# Patient Record
Sex: FEMALE | Race: WHITE | NOT HISPANIC OR LATINO | Employment: UNEMPLOYED | URBAN - METROPOLITAN AREA
[De-identification: names, ages, dates, MRNs, and addresses within clinical notes are randomized per-mention and may not be internally consistent; named-entity substitution may affect disease eponyms.]

---

## 2023-02-27 ENCOUNTER — OFFICE VISIT (OUTPATIENT)
Dept: GASTROENTEROLOGY | Facility: CLINIC | Age: 28
End: 2023-02-27
Payer: COMMERCIAL

## 2023-02-27 ENCOUNTER — LAB (OUTPATIENT)
Dept: LAB | Facility: HOSPITAL | Age: 28
End: 2023-02-27
Payer: COMMERCIAL

## 2023-02-27 VITALS
BODY MASS INDEX: 43.39 KG/M2 | DIASTOLIC BLOOD PRESSURE: 78 MMHG | WEIGHT: 270 LBS | HEART RATE: 72 BPM | HEIGHT: 66 IN | SYSTOLIC BLOOD PRESSURE: 111 MMHG

## 2023-02-27 DIAGNOSIS — K76.0 FATTY LIVER: ICD-10-CM

## 2023-02-27 DIAGNOSIS — R74.8 ELEVATED LIVER ENZYMES: ICD-10-CM

## 2023-02-27 DIAGNOSIS — R74.8 ELEVATED LIVER ENZYMES: Primary | ICD-10-CM

## 2023-02-27 LAB
BASOPHILS # BLD AUTO: 0.07 10*3/MM3 (ref 0–0.2)
BASOPHILS NFR BLD AUTO: 1 % (ref 0–1.5)
DEPRECATED RDW RBC AUTO: 39.7 FL (ref 37–54)
EOSINOPHIL # BLD AUTO: 0.33 10*3/MM3 (ref 0–0.4)
EOSINOPHIL NFR BLD AUTO: 4.6 % (ref 0.3–6.2)
ERYTHROCYTE [DISTWIDTH] IN BLOOD BY AUTOMATED COUNT: 12.1 % (ref 12.3–15.4)
HAV IGM SERPL QL IA: NORMAL
HBV CORE IGM SERPL QL IA: NORMAL
HBV SURFACE AG SERPL QL IA: NORMAL
HCT VFR BLD AUTO: 50 % (ref 34–46.6)
HCV AB SER DONR QL: NORMAL
HGB BLD-MCNC: 16.9 G/DL (ref 12–15.9)
IMM GRANULOCYTES # BLD AUTO: 0.02 10*3/MM3 (ref 0–0.05)
IMM GRANULOCYTES NFR BLD AUTO: 0.3 % (ref 0–0.5)
LYMPHOCYTES # BLD AUTO: 2.21 10*3/MM3 (ref 0.7–3.1)
LYMPHOCYTES NFR BLD AUTO: 31 % (ref 19.6–45.3)
MCH RBC QN AUTO: 30.2 PG (ref 26.6–33)
MCHC RBC AUTO-ENTMCNC: 33.8 G/DL (ref 31.5–35.7)
MCV RBC AUTO: 89.4 FL (ref 79–97)
MONOCYTES # BLD AUTO: 0.42 10*3/MM3 (ref 0.1–0.9)
MONOCYTES NFR BLD AUTO: 5.9 % (ref 5–12)
NEUTROPHILS NFR BLD AUTO: 4.09 10*3/MM3 (ref 1.7–7)
NEUTROPHILS NFR BLD AUTO: 57.2 % (ref 42.7–76)
NRBC BLD AUTO-RTO: 0 /100 WBC (ref 0–0.2)
PLATELET # BLD AUTO: 336 10*3/MM3 (ref 140–450)
PMV BLD AUTO: 10.8 FL (ref 6–12)
RBC # BLD AUTO: 5.59 10*6/MM3 (ref 3.77–5.28)
WBC NRBC COR # BLD: 7.14 10*3/MM3 (ref 3.4–10.8)

## 2023-02-27 PROCEDURE — 86334 IMMUNOFIX E-PHORESIS SERUM: CPT

## 2023-02-27 PROCEDURE — 84466 ASSAY OF TRANSFERRIN: CPT

## 2023-02-27 PROCEDURE — 86015 ACTIN ANTIBODY EACH: CPT | Performed by: NURSE PRACTITIONER

## 2023-02-27 PROCEDURE — 83540 ASSAY OF IRON: CPT

## 2023-02-27 PROCEDURE — 99204 OFFICE O/P NEW MOD 45 MIN: CPT | Performed by: NURSE PRACTITIONER

## 2023-02-27 PROCEDURE — 86038 ANTINUCLEAR ANTIBODIES: CPT | Performed by: NURSE PRACTITIONER

## 2023-02-27 PROCEDURE — 86225 DNA ANTIBODY NATIVE: CPT | Performed by: NURSE PRACTITIONER

## 2023-02-27 PROCEDURE — 80053 COMPREHEN METABOLIC PANEL: CPT

## 2023-02-27 PROCEDURE — 82784 ASSAY IGA/IGD/IGG/IGM EACH: CPT

## 2023-02-27 PROCEDURE — 86381 MITOCHONDRIAL ANTIBODY EACH: CPT | Performed by: NURSE PRACTITIONER

## 2023-02-27 PROCEDURE — 36415 COLL VENOUS BLD VENIPUNCTURE: CPT | Performed by: NURSE PRACTITIONER

## 2023-02-27 PROCEDURE — 80074 ACUTE HEPATITIS PANEL: CPT | Performed by: NURSE PRACTITIONER

## 2023-02-27 PROCEDURE — 85025 COMPLETE CBC W/AUTO DIFF WBC: CPT

## 2023-02-27 PROCEDURE — 82103 ALPHA-1-ANTITRYPSIN TOTAL: CPT | Performed by: NURSE PRACTITIONER

## 2023-02-27 PROCEDURE — 82390 ASSAY OF CERULOPLASMIN: CPT | Performed by: NURSE PRACTITIONER

## 2023-02-27 RX ORDER — METFORMIN HYDROCHLORIDE 500 MG/1
TABLET, EXTENDED RELEASE ORAL
COMMUNITY
Start: 2023-01-27

## 2023-02-27 NOTE — PROGRESS NOTES
"Chief Complaint     Elevated Hepatic Enzymes    History of Present Illness     Ginette Khalil is a 27 y.o. female who presents to [facility] on referral from Milla Figueroa MD for a gastroenterology evaluation of elevated liver enzymes.      Reports intermittent lower abdominal pain.  Reports having stones in gallbladder and has been evaluated by surgery. PCP was concerned about elevation liver enzymes and wanted her to undergo further evaluation.      Denies previous illicit drug use, ETOH abuse and unprofessional tattoos.           History      Past Medical History:   Diagnosis Date   • Diabetes mellitus (HCC)        Past Surgical History:   Procedure Laterality Date   • APPENDECTOMY         Family History   Problem Relation Age of Onset   • Colon cancer Neg Hx         Current Medications        Current Outpatient Medications:   •  metFORMIN ER (GLUCOPHAGE-XR) 500 MG 24 hr tablet, , Disp: , Rfl:      Allergies     No Known Allergies    Social History       Social History     Social History Narrative   • Not on file       Immunizations     Immunization:  Immunization History   Administered Date(s) Administered   • COVID-19 (PFIZER) PURPLE CAP 09/06/2021, 09/27/2021          Objective     Objective     Vital Signs:   /78 (BP Location: Left arm, Patient Position: Sitting, Cuff Size: Adult)   Pulse 72   Ht 167.6 cm (66\")   Wt 122 kg (270 lb)   BMI 43.58 kg/m²       Physical Exam    Results      Result Review :   The following data was reviewed by: MONA Steven on 02/27/2023:    10/11/22 CMP:  Creat 0.81, Alk phos 110, , , tbili 0.5.  CBC:  hgb 15.6, hct 45.9, plt  318.                   Assessment and Plan        Assessment and Plan    Diagnoses and all orders for this visit:    1. Elevated liver enzymes (Primary)  -     Hepatitis Panel, Acute  -     SHAILA  -     Alpha - 1 - Antitrypsin  -     Anti-Smooth Muscle Antibody Titer  -     " Ceruloplasmin  -     Immunofixation, Serum; Future  -     Iron Profile; Future  -     Mitochondrial Antibodies, M2  -     Cancel: US Abdomen Limited  -     CBC & Differential; Future  -     Comprehensive Metabolic Panel; Future  -     US Abdomen Limited; Future    2. Fatty liver          Follow Up        Follow Up   Return in about 4 months (around 6/27/2023) for elevated liver enzymes.  Patient was given instructions and counseling regarding her condition or for health maintenance advice. Please see specific information pulled into the AVS if appropriate.

## 2023-02-28 ENCOUNTER — TELEPHONE (OUTPATIENT)
Dept: GASTROENTEROLOGY | Facility: CLINIC | Age: 28
End: 2023-02-28
Payer: COMMERCIAL

## 2023-02-28 LAB
ALBUMIN SERPL-MCNC: 4.6 G/DL (ref 3.5–5.2)
ALBUMIN/GLOB SERPL: 1.6 G/DL
ALP SERPL-CCNC: 58 U/L (ref 39–117)
ALPHA1 GLOB MFR UR ELPH: 149 MG/DL (ref 90–200)
ALT SERPL W P-5'-P-CCNC: 70 U/L (ref 1–33)
ANION GAP SERPL CALCULATED.3IONS-SCNC: 9.3 MMOL/L (ref 5–15)
AST SERPL-CCNC: 47 U/L (ref 1–32)
BILIRUB SERPL-MCNC: 0.5 MG/DL (ref 0–1.2)
BUN SERPL-MCNC: 14 MG/DL (ref 6–20)
BUN/CREAT SERPL: 18.9 (ref 7–25)
CALCIUM SPEC-SCNC: 9.8 MG/DL (ref 8.6–10.5)
CERULOPLASMIN SERPL-MCNC: 25 MG/DL (ref 19–39)
CHLORIDE SERPL-SCNC: 103 MMOL/L (ref 98–107)
CO2 SERPL-SCNC: 27.7 MMOL/L (ref 22–29)
CREAT SERPL-MCNC: 0.74 MG/DL (ref 0.57–1)
DSDNA IGG SERPL IA-ACNC: NEGATIVE [IU]/ML
EGFRCR SERPLBLD CKD-EPI 2021: 113.9 ML/MIN/1.73
GLOBULIN UR ELPH-MCNC: 2.8 GM/DL
GLUCOSE SERPL-MCNC: 104 MG/DL (ref 65–99)
IGA SERPL-MCNC: 223 MG/DL (ref 87–352)
IGG SERPL-MCNC: 899 MG/DL (ref 586–1602)
IGM SERPL-MCNC: 138 MG/DL (ref 26–217)
IRON 24H UR-MRATE: 120 MCG/DL (ref 37–145)
IRON SATN MFR SERPL: 32 % (ref 20–50)
MITOCHONDRIA M2 IGG SER-ACNC: <20 UNITS (ref 0–20)
NUCLEAR IGG SER IA-RTO: NEGATIVE
POTASSIUM SERPL-SCNC: 4.1 MMOL/L (ref 3.5–5.2)
PROT PATTERN SERPL IFE-IMP: NORMAL
PROT SERPL-MCNC: 7.4 G/DL (ref 6–8.5)
SMA IGG SER-ACNC: 6 UNITS (ref 0–19)
SODIUM SERPL-SCNC: 140 MMOL/L (ref 136–145)
TIBC SERPL-MCNC: 380 MCG/DL (ref 298–536)
TRANSFERRIN SERPL-MCNC: 255 MG/DL (ref 200–360)

## 2023-02-28 NOTE — TELEPHONE ENCOUNTER
Contacted the patient, to informed that we have scheduled patient for US at [facility], scheduled for 03/13/2023 arrival time of 7:30 am and NPO after midnight the night before. Patient verbally understands.

## 2023-03-03 ENCOUNTER — TELEPHONE (OUTPATIENT)
Dept: GASTROENTEROLOGY | Facility: CLINIC | Age: 28
End: 2023-03-03
Payer: COMMERCIAL

## 2023-03-03 ENCOUNTER — PATIENT ROUNDING (BHMG ONLY) (OUTPATIENT)
Dept: GASTROENTEROLOGY | Facility: CLINIC | Age: 28
End: 2023-03-03
Payer: COMMERCIAL

## 2023-03-03 NOTE — PROGRESS NOTES
3/3/2023      Hello, may I speak with Ginette Khalil     My name is Linda, I am the Clinical Coordinator. I am calling from [facility]. I show that you had a recent visit with MONA Blackwell.    Before we get started may I verify your date of birth? 1995    I am calling to officially welcome you to our practice and ask about your recent visit. Is this a good time to talk? No answer     Tell me about your visit with us. What things went well?         We're always looking for ways to make our patients' experiences even better. Do you have recommendations on ways we may improve?    Overall were you satisfied with your first visit to our practice?    I appreciate you taking the time to speak with me today. Is there anything else I can do for you?    I am glad to hear that you had a very good visit and I appreciate you taking the time to provide feedback on this call. We would greatly appreciate you filling out a survey if you receive one in the mail, email or text. This is a great opportunity to provide any additional feedback that you may think of after this call as well.       Thank you, and have a great day.

## 2023-03-03 NOTE — TELEPHONE ENCOUNTER
----- Message from MONA Steven sent at 3/1/2023 12:21 PM EST -----  Liver work-up does not reveal any infectious, viral or autoimmune causes of elevated liver enzymes.  Await further imaging to complete work-up.

## 2023-03-14 ENCOUNTER — TELEPHONE (OUTPATIENT)
Dept: GASTROENTEROLOGY | Facility: CLINIC | Age: 28
End: 2023-03-14
Payer: COMMERCIAL

## 2023-03-14 NOTE — TELEPHONE ENCOUNTER
----- Message from MONA Steven sent at 3/13/2023 10:05 PM EDT -----  US c/w fatty liver.  Stone in gallbladder, no signs of cholelithiasis.

## 2024-02-27 ENCOUNTER — TRANSCRIBE ORDERS (OUTPATIENT)
Dept: OBSTETRICS AND GYNECOLOGY | Facility: HOSPITAL | Age: 29
End: 2024-02-27
Payer: COMMERCIAL

## 2024-02-27 DIAGNOSIS — O99.210 OBESITY IN PREGNANCY, ANTEPARTUM: ICD-10-CM

## 2024-02-27 DIAGNOSIS — O24.113 PRE-EXISTING TYPE 2 DIABETES MELLITUS IN PREGNANCY IN THIRD TRIMESTER: Primary | ICD-10-CM

## 2024-02-27 DIAGNOSIS — Z34.90 PREGNANCY, UNSPECIFIED GESTATIONAL AGE: ICD-10-CM

## 2024-03-04 ENCOUNTER — HOSPITAL ENCOUNTER (OUTPATIENT)
Dept: WOMENS IMAGING | Facility: HOSPITAL | Age: 29
Discharge: HOME | End: 2024-03-04
Admitting: NURSE PRACTITIONER
Payer: COMMERCIAL

## 2024-03-04 ENCOUNTER — MEDICATION THERAPY MANAGEMENT (OUTPATIENT)
Dept: OBSTETRICS AND GYNECOLOGY | Facility: HOSPITAL | Age: 29
End: 2024-03-04

## 2024-03-04 ENCOUNTER — OFFICE VISIT (OUTPATIENT)
Dept: OBSTETRICS AND GYNECOLOGY | Facility: HOSPITAL | Age: 29
End: 2024-03-04
Payer: COMMERCIAL

## 2024-03-04 VITALS — WEIGHT: 269 LBS | DIASTOLIC BLOOD PRESSURE: 66 MMHG | SYSTOLIC BLOOD PRESSURE: 117 MMHG | BODY MASS INDEX: 43.42 KG/M2

## 2024-03-04 DIAGNOSIS — O99.210 OBESITY IN PREGNANCY, ANTEPARTUM: ICD-10-CM

## 2024-03-04 DIAGNOSIS — O24.113 TYPE 2 DIABETES MELLITUS IN PREGNANCY, THIRD TRIMESTER: Primary | ICD-10-CM

## 2024-03-04 DIAGNOSIS — O24.113 PRE-EXISTING TYPE 2 DIABETES MELLITUS IN PREGNANCY IN THIRD TRIMESTER: ICD-10-CM

## 2024-03-04 DIAGNOSIS — Z34.90 PREGNANCY, UNSPECIFIED GESTATIONAL AGE: ICD-10-CM

## 2024-03-04 PROCEDURE — 99204 OFFICE O/P NEW MOD 45 MIN: CPT | Performed by: OBSTETRICS & GYNECOLOGY

## 2024-03-04 PROCEDURE — 93325 DOPPLER ECHO COLOR FLOW MAPG: CPT

## 2024-03-04 PROCEDURE — 76811 OB US DETAILED SNGL FETUS: CPT | Performed by: OBSTETRICS & GYNECOLOGY

## 2024-03-04 PROCEDURE — 76825 ECHO EXAM OF FETAL HEART: CPT

## 2024-03-04 PROCEDURE — 76825 ECHO EXAM OF FETAL HEART: CPT | Performed by: OBSTETRICS & GYNECOLOGY

## 2024-03-04 PROCEDURE — 76811 OB US DETAILED SNGL FETUS: CPT

## 2024-03-04 PROCEDURE — 93325 DOPPLER ECHO COLOR FLOW MAPG: CPT | Performed by: OBSTETRICS & GYNECOLOGY

## 2024-03-04 RX ORDER — ACYCLOVIR 400 MG/1
1 TABLET ORAL CONTINUOUS
Qty: 1 EACH | Refills: 0 | Status: SHIPPED | OUTPATIENT
Start: 2024-03-04

## 2024-03-04 RX ORDER — BLOOD SUGAR DIAGNOSTIC
STRIP MISCELLANEOUS
COMMUNITY
Start: 2024-02-20

## 2024-03-04 RX ORDER — PEN NEEDLE, DIABETIC 30 GX3/16"
1 NEEDLE, DISPOSABLE MISCELLANEOUS
Qty: 100 EACH | Refills: 2 | Status: SHIPPED | OUTPATIENT
Start: 2024-03-04

## 2024-03-04 RX ORDER — ACYCLOVIR 400 MG/1
1 TABLET ORAL
Qty: 3 EACH | Refills: 3 | Status: SHIPPED | OUTPATIENT
Start: 2024-03-04

## 2024-03-04 RX ORDER — BLOOD-GLUCOSE METER
EACH MISCELLANEOUS
COMMUNITY
Start: 2024-02-24

## 2024-03-04 RX ORDER — PNV NO.118/IRON FUMARATE/FA 29 MG-1 MG
TABLET,CHEWABLE ORAL
COMMUNITY
Start: 2023-09-27

## 2024-03-04 RX ORDER — LANCETS 33 GAUGE
EACH MISCELLANEOUS
COMMUNITY
Start: 2024-02-20

## 2024-03-04 RX ORDER — INSULIN ASPART 100 [IU]/ML
15 INJECTION, SOLUTION INTRAVENOUS; SUBCUTANEOUS
Qty: 15 ML | Refills: 1 | Status: SHIPPED | OUTPATIENT
Start: 2024-03-04

## 2024-03-04 NOTE — PROGRESS NOTES
Maternal/Fetal Medicine New Patient Note     Name: Ginette Khalil    : 1995     MRN: [id number]     Referring Provider: DEMETRIS See    Chief Complaint  T2DM    Subjective     History of Present Illness:  Ginette Khalil is a 28 y.o.  30w4d who presents today for evaluation in the setting of poorly controlled T2DM.   Patient diagnosed 2 years ago   Currently on Metformin 1500mg qHS   Brings a log with her today. She notes elevations at all time points.     SHIRAZ: Estimated Date of Delivery: 24     ROS:   As noted in HPI.     Past Medical History:   Diagnosis Date    Abnormal Pap smear of cervix     pap normal 2023    Arthritis     occured after back surgery (herniated disc L3-L4)    Diabetes mellitus     placed on metformin    History of infertility     did not tolerate clomid- made her feel crazy    PCOS (polycystic ovarian syndrome)     patient states a few doctors told her she has PCOS      Past Surgical History:   Procedure Laterality Date    APPENDECTOMY      emergent-went to ED with pain    BACK SURGERY      to repair herneated disc L3-L4    LAPAROSCOPIC CHOLECYSTECTOMY      WISDOM TOOTH EXTRACTION        OB History          1    Para   0    Term   0       0    AB   0    Living   0         SAB   0    IAB   0    Ectopic   0    Molar   0    Multiple   0    Live Births   0          Obstetric Comments   Fob#1 pregnancy#1               Current Outpatient Medications:     Blood Glucose Monitoring Suppl (ONE TOUCH ULTRA 2) w/Device kit, , Disp: , Rfl:     Lancets (OneTouch Delica Plus [id number]) misc, , Disp: , Rfl:     metFORMIN ER (GLUCOPHAGE-XR) 500 MG 24 hr tablet, 3 tablets Every Night., Disp: , Rfl:     OneTouch Ultra test strip, , Disp: , Rfl:     Prenatal Vit-Fe Fumarate-FA (Prenatal 19) chewable tablet, , Disp: , Rfl:     Objective     Vital Signs  /66   Wt 122 kg (269 lb)   Estimated body mass index is 43.42 kg/m² as calculated  "from the following:    Height as of 23: 167.6 cm (66\").    Weight as of this encounter: 122 kg (269 lb).    Ultrasound Impression:   See viewpoint    Assessment and Plan     Diagnoses and all orders for this visit:    1. Type 2 diabetes mellitus in pregnancy, third trimester (Primary)  Assessment & Plan:  We discussed with the patient the breadth of complications that can occur in pregnancies with poorly controlled diabetes. Fetal complications include congenital malformations, problems with fetal growth, fetal glucose regulation, increased risk of NICU admission and spontaneous intrauterine fetal demise. Maternal complications include increased risk of preeclampsia,  section, and increasing insulin requirements. It appears that the patient's diabetes is suboptimally controlled at this time as she has been having fasting glucoses regularly over 100 and postprandials as high as 200s.     We did discuss the risks and benefits to oral agents such as Metformin vs insulin. I recommend continuing Metformin and initiating weight based insulin.     Fetal echocardiogram appeared normal today.  Ms Remi met with our nurse educator today for insulin teaching     The following plan was established:   Diabetic Education (referral placed)   Starting the following insulin regimen: Lantus 25 units BID, Humalog 15 units with meals. Rx was placed   We have placed a Rx for Dexcom.   Glucoses will be reviewed weekly by MFM for insulin titration   Follow up evaluation of fetal growth in 4 weeks is scheduled   Patient will need twice weekly  testing in your office starting at 32 weeks GA   Recommend collection of baseline urine p:c ratio or 24 hour urine as patient is high risk for developing preeclampsia   Patient in need of baseline EKG   9.   Delivery timing TBD based on future glycemic control and maternal/fetal status.       Orders:  -     Ambulatory Referral to Diabetic Education  -     [facility]; Future         Follow Up  4 weeks     I spent 45 minutes caring for the patient on the day of service. This included: obtaining or reviewing a separately obtained medical history, reviewing patient records, performing a medically appropriate exam and/or evaluation, counseling or educating the patient/family/caregiver, ordering medications, labs, and/or procedures and documenting such in the medical record. This does not include time spent on review and interpretation of other tests such as fetal ultrasound or the performance of other procedures such as amniocentesis or CVS.    Jacqueline Bond MD FACOG  Maternal Fetal Medicine, [facility]     2024

## 2024-03-04 NOTE — ASSESSMENT & PLAN NOTE
We discussed with the patient the breadth of complications that can occur in pregnancies with poorly controlled diabetes. Fetal complications include congenital malformations, problems with fetal growth, fetal glucose regulation, increased risk of NICU admission and spontaneous intrauterine fetal demise. Maternal complications include increased risk of preeclampsia,  section, and increasing insulin requirements. It appears that the patient's diabetes is suboptimally controlled at this time as she has been having fasting glucoses regularly over 100 and postprandials as high as 200s.     We did discuss the risks and benefits to oral agents such as Metformin vs insulin. I recommend continuing Metformin and initiating weight based insulin.     Fetal echocardiogram appeared normal today.  Ms Khalil met with our nurse educator today for insulin teaching     The following plan was established:   Diabetic Education (referral placed)   Starting the following insulin regimen: Lantus 25 units BID, Humalog 15 units with meals. Rx was placed   We have placed a Rx for Dexcom.   Glucoses will be reviewed weekly by MFM for insulin titration   Follow up evaluation of fetal growth in 4 weeks is scheduled   Patient will need twice weekly  testing in your office starting at 32 weeks GA   Recommend collection of baseline urine p:c ratio or 24 hour urine as patient is high risk for developing preeclampsia   Patient in need of baseline EKG   9.   Delivery timing TBD based on future glycemic control and maternal/fetal status.

## 2024-03-04 NOTE — LETTER
2024       No Recipients    Patient: Ginette Khalil   YOB: 1995   Date of Visit: 3/4/2024       Dear Tena Maciel CNM,    Thank you for referring Ginette Khalil to me for evaluation. Below is a copy of my consult note.    If you have questions, please do not hesitate to call me. I look forward to following Ginette along with you.         Sincerely,        Jacqueline Bond MD        CC:   No Recipients        Maternal/Fetal Medicine New Patient Note     Name: Ginette Khalil    : 1995     MRN: [id number]     Referring Provider: DOREEN See*    Chief Complaint  T2DM    Subjective     History of Present Illness:  Ginette Khalil is a 28 y.o.  30w4d who presents today for evaluation in the setting of poorly controlled T2DM.   Patient diagnosed 2 years ago   Currently on Metformin 1500mg qHS   Brings a log with her today. She notes elevations at all time points.     SHIRAZ: Estimated Date of Delivery: 24     ROS:   As noted in HPI.     Past Medical History:   Diagnosis Date   • Abnormal Pap smear of cervix     pap normal 2023   • Arthritis     occured after back surgery (herniated disc L3-L4)   • Diabetes mellitus     placed on metformin   • History of infertility     did not tolerate clomid- made her feel crazy   • PCOS (polycystic ovarian syndrome)     patient states a few doctors told her she has PCOS      Past Surgical History:   Procedure Laterality Date   • APPENDECTOMY  2016    emergent-went to ED with pain   • BACK SURGERY      to repair herneated disc L3-L4   • LAPAROSCOPIC CHOLECYSTECTOMY     • WISDOM TOOTH EXTRACTION        OB History          1    Para   0    Term   0       0    AB   0    Living   0         SAB   0    IAB   0    Ectopic   0    Molar   0    Multiple   0    Live Births   0          Obstetric Comments   Fob#1 pregnancy#1               Current Outpatient Medications:   •  Blood Glucose Monitoring Suppl  "(ONE TOUCH ULTRA 2) w/Device kit, , Disp: , Rfl:   •  Lancets (OneTouch Delica Plus [id number]) misc, , Disp: , Rfl:   •  metFORMIN ER (GLUCOPHAGE-XR) 500 MG 24 hr tablet, 3 tablets Every Night., Disp: , Rfl:   •  OneTouch Ultra test strip, , Disp: , Rfl:   •  Prenatal Vit-Fe Fumarate-FA (Prenatal 19) chewable tablet, , Disp: , Rfl:     Objective     Vital Signs  /66   Wt 122 kg (269 lb)   Estimated body mass index is 43.42 kg/m² as calculated from the following:    Height as of 23: 167.6 cm (66\").    Weight as of this encounter: 122 kg (269 lb).    Ultrasound Impression:   See viewpoint    Assessment and Plan     Diagnoses and all orders for this visit:    1. Type 2 diabetes mellitus in pregnancy, third trimester (Primary)  Assessment & Plan:  We discussed with the patient the breadth of complications that can occur in pregnancies with poorly controlled diabetes. Fetal complications include congenital malformations, problems with fetal growth, fetal glucose regulation, increased risk of NICU admission and spontaneous intrauterine fetal demise. Maternal complications include increased risk of preeclampsia,  section, and increasing insulin requirements. It appears that the patient's diabetes is suboptimally controlled at this time as she has been having fasting glucoses regularly over 100 and postprandials as high as 200s.     We did discuss the risks and benefits to oral agents such as Metformin vs insulin. I recommend continuing Metformin and initiating weight based insulin.     Fetal echocardiogram appeared normal today.  Ms Phillipss met with our nurse educator today for insulin teaching     The following plan was established:   Diabetic Education (referral placed)   Starting the following insulin regimen: Lantus 25 units BID, Humalog 15 units with meals. Rx was placed   We have placed a Rx for Dexcom.   Glucoses will be reviewed weekly by MFM for insulin titration   Follow up evaluation of fetal " growth in 4 weeks is scheduled   Patient will need twice weekly  testing in your office starting at 32 weeks GA   Recommend collection of baseline urine p:c ratio or 24 hour urine as patient is high risk for developing preeclampsia   Patient in need of baseline EKG   9.   Delivery timing TBD based on future glycemic control and maternal/fetal status.       Orders:  -     Ambulatory Referral to Diabetic Education  -     [facility]; Future         Follow Up  4 weeks     I spent 45 minutes caring for the patient on the day of service. This included: obtaining or reviewing a separately obtained medical history, reviewing patient records, performing a medically appropriate exam and/or evaluation, counseling or educating the patient/family/caregiver, ordering medications, labs, and/or procedures and documenting such in the medical record. This does not include time spent on review and interpretation of other tests such as fetal ultrasound or the performance of other procedures such as amniocentesis or CVS.    Jacqueline Bond MD FACOG  Maternal Fetal Medicine, [facility]     2024

## 2024-03-13 ENCOUNTER — TELEPHONE (OUTPATIENT)
Dept: OBSTETRICS AND GYNECOLOGY | Facility: HOSPITAL | Age: 29
End: 2024-03-13
Payer: COMMERCIAL

## 2024-03-13 ENCOUNTER — MEDICATION THERAPY MANAGEMENT (OUTPATIENT)
Dept: OBSTETRICS AND GYNECOLOGY | Facility: HOSPITAL | Age: 29
End: 2024-03-13
Payer: COMMERCIAL

## 2024-03-13 DIAGNOSIS — O24.113 TYPE 2 DIABETES MELLITUS IN PREGNANCY, THIRD TRIMESTER: ICD-10-CM

## 2024-03-13 RX ORDER — INSULIN ASPART 100 [IU]/ML
18 INJECTION, SOLUTION INTRAVENOUS; SUBCUTANEOUS
Qty: 15 ML | Refills: 1 | Status: SHIPPED | OUTPATIENT
Start: 2024-03-13

## 2024-03-13 NOTE — TELEPHONE ENCOUNTER
"Attempted to reach patient by phone, received message stating call could not be completed at this time.  Please hang up and try your call again later.  Detailed Feastie message sent stating \"Dr. Osborne has reviewed your blood sugar log and is increasing your Lantus to 28 units twice a day and increasing your Novolog to 18 units with meals.  Please respond to this message so I know you received the changes or you may call the office at [phone] to let me know.\"  Margarita Du RN  "

## 2024-03-20 ENCOUNTER — TELEPHONE (OUTPATIENT)
Dept: OBSTETRICS AND GYNECOLOGY | Facility: HOSPITAL | Age: 29
End: 2024-03-20
Payer: COMMERCIAL

## 2024-03-20 ENCOUNTER — MEDICATION THERAPY MANAGEMENT (OUTPATIENT)
Dept: OBSTETRICS AND GYNECOLOGY | Facility: HOSPITAL | Age: 29
End: 2024-03-20
Payer: COMMERCIAL

## 2024-03-20 DIAGNOSIS — O24.113 TYPE 2 DIABETES MELLITUS IN PREGNANCY, THIRD TRIMESTER: ICD-10-CM

## 2024-03-20 RX ORDER — INSULIN ASPART 100 [IU]/ML
20 INJECTION, SOLUTION INTRAVENOUS; SUBCUTANEOUS
Qty: 15 ML | Refills: 1 | Status: SHIPPED | OUTPATIENT
Start: 2024-03-20

## 2024-03-20 NOTE — TELEPHONE ENCOUNTER
"Attempted to reach patient by phone, received voice mail.  Message left stating detailed Mychart message sent and to respond to that.  Dr. Osborne reviewed your blood sugar log and is making the following changes.  Increase your Lantus to 34 units twice a day.  Increase your Novolog to 20 units with meals three times a day.  She also states \"Baby is in peak growth phase, so need to get into better range!.  Please change your range in the clarity kaden to 65mg/dl as your low and 140 mg/dl as your high.  Margarita Du RN  "

## 2024-03-28 ENCOUNTER — MEDICATION THERAPY MANAGEMENT (OUTPATIENT)
Dept: OBSTETRICS AND GYNECOLOGY | Facility: HOSPITAL | Age: 29
End: 2024-03-28
Payer: COMMERCIAL

## 2024-03-28 ENCOUNTER — TELEPHONE (OUTPATIENT)
Dept: OBSTETRICS AND GYNECOLOGY | Facility: HOSPITAL | Age: 29
End: 2024-03-28
Payer: COMMERCIAL

## 2024-03-28 DIAGNOSIS — O24.113 TYPE 2 DIABETES MELLITUS IN PREGNANCY, THIRD TRIMESTER: ICD-10-CM

## 2024-03-28 RX ORDER — INSULIN ASPART 100 [IU]/ML
INJECTION, SOLUTION INTRAVENOUS; SUBCUTANEOUS
Qty: 15 ML | Refills: 3
Start: 2024-03-28

## 2024-03-28 NOTE — TELEPHONE ENCOUNTER
"\"Call cannot be completed at this time, please hang up and try again later\",  will send my chart message   "

## 2024-04-02 ENCOUNTER — MEDICATION THERAPY MANAGEMENT (OUTPATIENT)
Dept: OBSTETRICS AND GYNECOLOGY | Facility: HOSPITAL | Age: 29
End: 2024-04-02
Payer: COMMERCIAL

## 2024-04-02 DIAGNOSIS — O24.113 TYPE 2 DIABETES MELLITUS IN PREGNANCY, THIRD TRIMESTER: ICD-10-CM

## 2024-04-02 RX ORDER — INSULIN ASPART 100 [IU]/ML
INJECTION, SOLUTION INTRAVENOUS; SUBCUTANEOUS
Start: 2024-04-02

## 2024-04-03 ENCOUNTER — OFFICE VISIT (OUTPATIENT)
Dept: OBSTETRICS AND GYNECOLOGY | Facility: HOSPITAL | Age: 29
End: 2024-04-03
Payer: COMMERCIAL

## 2024-04-03 ENCOUNTER — HOSPITAL ENCOUNTER (OUTPATIENT)
Dept: WOMENS IMAGING | Facility: HOSPITAL | Age: 29
Discharge: HOME | End: 2024-04-03
Admitting: OBSTETRICS & GYNECOLOGY
Payer: COMMERCIAL

## 2024-04-03 VITALS — WEIGHT: 277.6 LBS | DIASTOLIC BLOOD PRESSURE: 61 MMHG | SYSTOLIC BLOOD PRESSURE: 114 MMHG | BODY MASS INDEX: 44.81 KG/M2

## 2024-04-03 DIAGNOSIS — O24.113 TYPE 2 DIABETES MELLITUS IN PREGNANCY, THIRD TRIMESTER: ICD-10-CM

## 2024-04-03 DIAGNOSIS — O24.113 TYPE 2 DIABETES MELLITUS IN PREGNANCY, THIRD TRIMESTER: Primary | ICD-10-CM

## 2024-04-03 PROCEDURE — 76819 FETAL BIOPHYS PROFIL W/O NST: CPT

## 2024-04-03 PROCEDURE — 99213 OFFICE O/P EST LOW 20 MIN: CPT | Performed by: OBSTETRICS & GYNECOLOGY

## 2024-04-03 PROCEDURE — 76816 OB US FOLLOW-UP PER FETUS: CPT | Performed by: OBSTETRICS & GYNECOLOGY

## 2024-04-03 PROCEDURE — 76816 OB US FOLLOW-UP PER FETUS: CPT

## 2024-04-03 PROCEDURE — 76819 FETAL BIOPHYS PROFIL W/O NST: CPT | Performed by: OBSTETRICS & GYNECOLOGY

## 2024-04-03 NOTE — LETTER
April 3, 2024       No Recipients    Patient: Ginette Khalil   YOB: 1995   Date of Visit: 4/3/2024       Dear Tena Maciel CNM,    Thank you for referring Ginette Khalil to me for evaluation. Below is a copy of my consult note.    If you have questions, please do not hesitate to call me. I look forward to following Ginette along with you.         Sincerely,        Farhat Jacques MD        CC:   No Recipients    No complaints today, Next f/u with Raheem LEONE On 24, NIPT - neg   Pt given new doses of Novolog per Dr Jacques's review yesterday         Maternal/Fetal Medicine Consult Note   Date: 2024  Name: Ginette Khalil    : 1995     MRN: [id number]     Referring Provider: DOREEN See*    Chief Complaint  Type 2 DM, MO     Subjective     History of Present Illness:  Ginette Khalil is a 28 y.o.  34w6d who presents today for type 2 diabetes    SHIRAZ: Estimated Date of Delivery: 24     ROS:   Otherwise Noted in HPI      Current Outpatient Medications:   •  Insulin Glargine (LANTUS SOLOSTAR) 100 UNIT/ML injection pen, Inject 34 Units under the skin into the appropriate area as directed 2 (Two) Times a Day., Disp: 15 mL, Rfl: 1  •  Blood Glucose Monitoring Suppl (ONE TOUCH ULTRA 2) w/Device kit, , Disp: , Rfl:   •  Continuous Blood Gluc  (Dexcom G7 ) device, Use 1 Device Continuous., Disp: 1 each, Rfl: 0  •  Continuous Blood Gluc Sensor (Dexcom G7 Sensor) misc, Use 1 each Every 10 (Ten) Days., Disp: 3 each, Rfl: 3  •  insulin aspart (NovoLOG FlexPen) 100 UNIT/ML solution pen-injector sc pen, Novolog 20 units with Breakfast, 26 units with lunch and 26 units with dinner., Disp: , Rfl:   •  Insulin Pen Needle (Pen Needles) 31G X 5 MM misc, Use 1 each 5 (Five) Times a Day., Disp: 100 each, Rfl: 2  •  Lancets (OneTouch Delica Plus [id number]) misc, , Disp: , Rfl:   •  metFORMIN ER (GLUCOPHAGE-XR) 500 MG 24 hr tablet, 3 tablets Every Night., Disp: , Rfl:   •   "OneTouch Ultra test strip, , Disp: , Rfl:   •  Prenatal Vit-Fe Fumarate-FA (Prenatal 19) chewable tablet, , Disp: , Rfl:     Objective     Vital Signs  /61   Wt 126 kg (277 lb 9.6 oz)   Estimated body mass index is 44.81 kg/m² as calculated from the following:    Height as of 23: 167.6 cm (66\").    Weight as of this encounter: 126 kg (277 lb 9.6 oz).    Ultrasound Impression:   See Viewpoint     Assessment and Plan     Ginette Khalil is a 28 y.o.  34w6d who presents today for type 2 diabetes    Diagnoses and all orders for this visit:    1. Type 2 diabetes mellitus in pregnancy, third trimester (Primary)  Assessment & Plan:  Long acting insulin 34 units twice daily and short acting 20 units with breakfast and 24 units with lunch and dinner.  Blood glucoses reviewed and will increase lunch and dinner short acting to 26 units.  Today's ultrasound shows overall fetal growth at the 85th percentile though abdominal circumference at the 98th percentile.  Normal SUSAN and BPP.  Given suboptimal control of type 2 diabetes with evidence of accelerated fetal growth would recommend delivery during the 37th or 38th week of gestation.  Would recommend continue nonstress test until then.  We did discuss the importance of continued glucose monitoring until timing of delivery, discussed  risk of respiratory distress and hypoglycemia.    Orders:  -     Cancel: [facility]; Future         Follow Up  Follow-up as clinically indicated    I spent 20 minutes caring for the patient on the day of service. This included: obtaining or reviewing a separately obtained medical history, reviewing patient records, performing a medically appropriate exam and/or evaluation, counseling or educating the patient/family/caregiver, ordering medications, labs, and/or procedures and documenting such in the medical record. This does not include time spent on review and interpretation of other tests such as " fetal ultrasound or the performance of other procedures such as amniocentesis or CVS.      Farhat Jacques MD, FACOG  Maternal Fetal Medicine, [facility] [place]

## 2024-04-03 NOTE — PROGRESS NOTES
No complaints today, Next f/u with Raheem LEONE On 4/11/24, NIPT - neg   Pt given new doses of Novolog per Dr Jacques's review yesterday

## 2024-04-03 NOTE — ASSESSMENT & PLAN NOTE
Long acting insulin 34 units twice daily and short acting 20 units with breakfast and 24 units with lunch and dinner.  Blood glucoses reviewed and will increase lunch and dinner short acting to 26 units.  Today's ultrasound shows overall fetal growth at the 85th percentile though abdominal circumference at the 98th percentile.  Normal SUSAN and BPP.  Given suboptimal control of type 2 diabetes with evidence of accelerated fetal growth would recommend delivery during the 37th or 38th week of gestation.  Would recommend continue nonstress test until then.  We did discuss the importance of continued glucose monitoring until timing of delivery, discussed  risk of respiratory distress and hypoglycemia.

## 2024-04-03 NOTE — PROGRESS NOTES
"    Maternal/Fetal Medicine Consult Note   Date: 2024  Name: Ginette Khalil    : 1995     MRN: [id number]     Referring Provider: DEMETRIS See    Chief Complaint  Type 2 DM, MO     Subjective     History of Present Illness:  Ginette Khalil is a 28 y.o.  34w6d who presents today for type 2 diabetes    SHIRAZ: Estimated Date of Delivery: 24     ROS:   Otherwise Noted in HPI      Current Outpatient Medications:     Insulin Glargine (LANTUS SOLOSTAR) 100 UNIT/ML injection pen, Inject 34 Units under the skin into the appropriate area as directed 2 (Two) Times a Day., Disp: 15 mL, Rfl: 1    Blood Glucose Monitoring Suppl (ONE TOUCH ULTRA 2) w/Device kit, , Disp: , Rfl:     Continuous Blood Gluc  (Dexcom G7 ) device, Use 1 Device Continuous., Disp: 1 each, Rfl: 0    Continuous Blood Gluc Sensor (Dexcom G7 Sensor) misc, Use 1 each Every 10 (Ten) Days., Disp: 3 each, Rfl: 3    insulin aspart (NovoLOG FlexPen) 100 UNIT/ML solution pen-injector sc pen, Novolog 20 units with Breakfast, 26 units with lunch and 26 units with dinner., Disp: , Rfl:     Insulin Pen Needle (Pen Needles) 31G X 5 MM misc, Use 1 each 5 (Five) Times a Day., Disp: 100 each, Rfl: 2    Lancets (OneTouch Delica Plus Hluuzf76O) misc, , Disp: , Rfl:     metFORMIN ER (GLUCOPHAGE-XR) 500 MG 24 hr tablet, 3 tablets Every Night., Disp: , Rfl:     OneTouch Ultra test strip, , Disp: , Rfl:     Prenatal Vit-Fe Fumarate-FA (Prenatal 19) chewable tablet, , Disp: , Rfl:     Objective     Vital Signs  /61   Wt 126 kg (277 lb 9.6 oz)   Estimated body mass index is 44.81 kg/m² as calculated from the following:    Height as of 23: 167.6 cm (66\").    Weight as of this encounter: 126 kg (277 lb 9.6 oz).    Ultrasound Impression:   See Viewpoint     Assessment and Plan     Ginette Khalil is a 28 y.o.  34w6d who presents today for type 2 diabetes    Diagnoses and all orders for this visit:    1. Type 2 " diabetes mellitus in pregnancy, third trimester (Primary)  Assessment & Plan:  Long acting insulin 34 units twice daily and short acting 20 units with breakfast and 24 units with lunch and dinner.  Blood glucoses reviewed and will increase lunch and dinner short acting to 26 units.  Today's ultrasound shows overall fetal growth at the 85th percentile though abdominal circumference at the 98th percentile.  Normal SUSAN and BPP.  Given suboptimal control of type 2 diabetes with evidence of accelerated fetal growth would recommend delivery during the 37th or 38th week of gestation.  Would recommend continue nonstress test until then.  We did discuss the importance of continued glucose monitoring until timing of delivery, discussed  risk of respiratory distress and hypoglycemia.    Orders:  -     Cancel: [facility]; Future         Follow Up  Follow-up as clinically indicated    I spent 20 minutes caring for the patient on the day of service. This included: obtaining or reviewing a separately obtained medical history, reviewing patient records, performing a medically appropriate exam and/or evaluation, counseling or educating the patient/family/caregiver, ordering medications, labs, and/or procedures and documenting such in the medical record. This does not include time spent on review and interpretation of other tests such as fetal ultrasound or the performance of other procedures such as amniocentesis or CVS.      Farhat Jacques MD, FACOG  Maternal Fetal Medicine, [facility]

## 2024-04-09 ENCOUNTER — MEDICATION THERAPY MANAGEMENT (OUTPATIENT)
Dept: OBSTETRICS AND GYNECOLOGY | Facility: HOSPITAL | Age: 29
End: 2024-04-09
Payer: COMMERCIAL

## 2024-04-09 ENCOUNTER — DOCUMENTATION (OUTPATIENT)
Dept: OBSTETRICS AND GYNECOLOGY | Facility: HOSPITAL | Age: 29
End: 2024-04-09
Payer: COMMERCIAL

## 2024-04-09 DIAGNOSIS — O24.113 TYPE 2 DIABETES MELLITUS IN PREGNANCY, THIRD TRIMESTER: ICD-10-CM

## 2024-04-09 RX ORDER — INSULIN ASPART 100 [IU]/ML
INJECTION, SOLUTION INTRAVENOUS; SUBCUTANEOUS
Start: 2024-04-09

## 2024-04-09 NOTE — PROGRESS NOTES
Received message from patient stating her blood sugars have been up and her primary OB wanted her to reach out to us today instead of tomorrow.  Replied back through Mychart to patient that Dr. Osborne reviewed patients log and has increased her Lantus to 40 units twice a day and increased her Novolog to 20 units with breakfast, and 30 units with lunch and dinner.  Patient voices understanding.  Margarita Du RN

## 2024-04-17 ENCOUNTER — TELEPHONE (OUTPATIENT)
Dept: OBSTETRICS AND GYNECOLOGY | Facility: HOSPITAL | Age: 29
End: 2024-04-17
Payer: COMMERCIAL

## 2024-04-17 NOTE — TELEPHONE ENCOUNTER
Attempted to reach patient by phone, received message stating this call could not be completed at this time.  Detailed [employer] message send stating:  Dr. Osborne has reviewed your blood sugar log and is not making changes.  For your delivery plan for insulin she wants you to on 4/21/24 take your morning Lantus as usual.  Take your mealtime Novolog as usual.  DO NOT take your evening Lantus.  On 4/22/24 if you are still pregnancy and after deliver: take Lantus 40 units every morning through induction and postparum. (Take Novolog 10 units with meals after delivery).  If you have any questions or concerns please call us at [phone].  Please respond to this [employer] message so I know you received these instructions.  Margarita Du RN

## 2024-04-29 ENCOUNTER — TELEPHONE (OUTPATIENT)
Dept: OBSTETRICS AND GYNECOLOGY | Facility: HOSPITAL | Age: 29
End: 2024-04-29
Payer: COMMERCIAL

## 2024-04-29 NOTE — TELEPHONE ENCOUNTER
Attempted to reach patient by phone,  received message stating this call can not be completed at this time.  [employer] message sent offering patient congratulations and checking to see if she is off medications for her T2DM or what medication and dosage she is still on.    Margarita Du RN

## 2024-05-01 ENCOUNTER — TELEPHONE (OUTPATIENT)
Dept: OBSTETRICS AND GYNECOLOGY | Facility: HOSPITAL | Age: 29
End: 2024-05-01
Payer: COMMERCIAL

## 2024-05-01 NOTE — TELEPHONE ENCOUNTER
Attempted to reach patient by phone, received message stating call could not be completed at this time, please hang up and try again later.  Will send another [employer] message requesting patient upload her Dexcom data  Margarita Du RN

## 2025-06-05 DIAGNOSIS — O24.113 TYPE 2 DIABETES MELLITUS IN PREGNANCY, THIRD TRIMESTER: ICD-10-CM

## 2025-06-06 RX ORDER — ACYCLOVIR 400 MG/1
TABLET ORAL
Qty: 3 EACH | Refills: 3 | OUTPATIENT
Start: 2025-06-06

## 2025-06-26 ENCOUNTER — TRANSCRIBE ORDERS (OUTPATIENT)
Dept: OBSTETRICS AND GYNECOLOGY | Facility: HOSPITAL | Age: 30
End: 2025-06-26
Payer: COMMERCIAL

## 2025-06-26 DIAGNOSIS — O99.210 OBESITY IN PREGNANCY, ANTEPARTUM: ICD-10-CM

## 2025-06-26 DIAGNOSIS — O24.113 TYPE 2 DIABETES MELLITUS IN PREGNANCY, THIRD TRIMESTER: Primary | ICD-10-CM

## 2025-06-26 DIAGNOSIS — Z34.90 PREGNANCY, UNSPECIFIED GESTATIONAL AGE: ICD-10-CM

## 2025-07-09 ENCOUNTER — OFFICE VISIT (OUTPATIENT)
Dept: OBSTETRICS AND GYNECOLOGY | Facility: HOSPITAL | Age: 30
End: 2025-07-09
Payer: COMMERCIAL

## 2025-07-09 ENCOUNTER — DOCUMENTATION (OUTPATIENT)
Dept: OBSTETRICS AND GYNECOLOGY | Facility: HOSPITAL | Age: 30
End: 2025-07-09
Payer: COMMERCIAL

## 2025-07-09 ENCOUNTER — MEDICATION THERAPY MANAGEMENT (OUTPATIENT)
Dept: OBSTETRICS AND GYNECOLOGY | Facility: HOSPITAL | Age: 30
End: 2025-07-09

## 2025-07-09 ENCOUNTER — HOSPITAL ENCOUNTER (OUTPATIENT)
Dept: WOMENS IMAGING | Facility: HOSPITAL | Age: 30
Discharge: HOME | End: 2025-07-09
Payer: COMMERCIAL

## 2025-07-09 ENCOUNTER — LAB (OUTPATIENT)
Dept: LAB | Facility: HOSPITAL | Age: 30
End: 2025-07-09
Payer: COMMERCIAL

## 2025-07-09 VITALS
BODY MASS INDEX: 44.61 KG/M2 | DIASTOLIC BLOOD PRESSURE: 64 MMHG | SYSTOLIC BLOOD PRESSURE: 105 MMHG | WEIGHT: 277.6 LBS | HEIGHT: 66 IN

## 2025-07-09 DIAGNOSIS — O24.113 TYPE 2 DIABETES MELLITUS IN PREGNANCY, THIRD TRIMESTER: ICD-10-CM

## 2025-07-09 DIAGNOSIS — E66.01 MORBID OBESITY WITH BMI OF 40.0-44.9, ADULT: ICD-10-CM

## 2025-07-09 DIAGNOSIS — O99.210 OBESITY IN PREGNANCY, ANTEPARTUM: ICD-10-CM

## 2025-07-09 DIAGNOSIS — K76.0 FATTY LIVER IN MOTHER DURING PREGNANCY: Primary | ICD-10-CM

## 2025-07-09 DIAGNOSIS — Z34.90 PREGNANCY, UNSPECIFIED GESTATIONAL AGE: ICD-10-CM

## 2025-07-09 DIAGNOSIS — O26.619 FATTY LIVER IN MOTHER DURING PREGNANCY: Primary | ICD-10-CM

## 2025-07-09 DIAGNOSIS — Z3A.10 10 WEEKS GESTATION OF PREGNANCY: ICD-10-CM

## 2025-07-09 LAB
ALP SERPL-CCNC: 57 U/L (ref 39–117)
ALT SERPL W P-5'-P-CCNC: 82 U/L (ref 1–33)
AST SERPL-CCNC: 76 U/L (ref 1–32)
BILIRUB SERPL-MCNC: 0.4 MG/DL (ref 0–1.2)
CREAT SERPL-MCNC: 0.54 MG/DL (ref 0.57–1)
CREAT UR-MCNC: 201.3 MG/DL
DEPRECATED RDW RBC AUTO: 41.1 FL (ref 37–54)
ERYTHROCYTE [DISTWIDTH] IN BLOOD BY AUTOMATED COUNT: 12.6 % (ref 12.3–15.4)
HBA1C MFR BLD: 6.1 % (ref 4.8–5.6)
HCT VFR BLD AUTO: 46.4 % (ref 34–46.6)
HGB BLD-MCNC: 15.5 G/DL (ref 12–15.9)
LDH SERPL-CCNC: 238 U/L (ref 135–214)
MCH RBC QN AUTO: 30.2 PG (ref 26.6–33)
MCHC RBC AUTO-ENTMCNC: 33.4 G/DL (ref 31.5–35.7)
MCV RBC AUTO: 90.4 FL (ref 79–97)
PLATELET # BLD AUTO: 357 10*3/MM3 (ref 140–450)
PMV BLD AUTO: 11 FL (ref 6–12)
PROT ?TM UR-MCNC: 13.5 MG/DL
RBC # BLD AUTO: 5.13 10*6/MM3 (ref 3.77–5.28)
TSH SERPL DL<=0.05 MIU/L-ACNC: 1.54 UIU/ML (ref 0.27–4.2)
URATE SERPL-MCNC: 4.7 MG/DL (ref 2.4–5.7)
WBC NRBC COR # BLD AUTO: 10.31 10*3/MM3 (ref 3.4–10.8)

## 2025-07-09 PROCEDURE — 84550 ASSAY OF BLOOD/URIC ACID: CPT | Performed by: OBSTETRICS & GYNECOLOGY

## 2025-07-09 PROCEDURE — 84450 TRANSFERASE (AST) (SGOT): CPT | Performed by: OBSTETRICS & GYNECOLOGY

## 2025-07-09 PROCEDURE — 82247 BILIRUBIN TOTAL: CPT | Performed by: OBSTETRICS & GYNECOLOGY

## 2025-07-09 PROCEDURE — 84460 ALANINE AMINO (ALT) (SGPT): CPT | Performed by: OBSTETRICS & GYNECOLOGY

## 2025-07-09 PROCEDURE — 85027 COMPLETE CBC AUTOMATED: CPT | Performed by: OBSTETRICS & GYNECOLOGY

## 2025-07-09 PROCEDURE — 84156 ASSAY OF PROTEIN URINE: CPT | Performed by: OBSTETRICS & GYNECOLOGY

## 2025-07-09 PROCEDURE — 76801 OB US < 14 WKS SINGLE FETUS: CPT

## 2025-07-09 PROCEDURE — 82570 ASSAY OF URINE CREATININE: CPT | Performed by: OBSTETRICS & GYNECOLOGY

## 2025-07-09 PROCEDURE — 83615 LACTATE (LD) (LDH) ENZYME: CPT | Performed by: OBSTETRICS & GYNECOLOGY

## 2025-07-09 PROCEDURE — 84443 ASSAY THYROID STIM HORMONE: CPT | Performed by: OBSTETRICS & GYNECOLOGY

## 2025-07-09 PROCEDURE — 82565 ASSAY OF CREATININE: CPT | Performed by: OBSTETRICS & GYNECOLOGY

## 2025-07-09 PROCEDURE — 36415 COLL VENOUS BLD VENIPUNCTURE: CPT | Performed by: OBSTETRICS & GYNECOLOGY

## 2025-07-09 PROCEDURE — 83036 HEMOGLOBIN GLYCOSYLATED A1C: CPT | Performed by: OBSTETRICS & GYNECOLOGY

## 2025-07-09 PROCEDURE — 84075 ASSAY ALKALINE PHOSPHATASE: CPT | Performed by: OBSTETRICS & GYNECOLOGY

## 2025-07-09 RX ORDER — PRENATAL VIT/IRON FUM/FOLIC AC 27MG-0.8MG
1 TABLET ORAL DAILY
COMMUNITY

## 2025-07-09 RX ORDER — ACYCLOVIR 400 MG/1
1 TABLET ORAL
Qty: 3 EACH | Refills: 11 | Status: SHIPPED | OUTPATIENT
Start: 2025-07-09

## 2025-07-09 RX ORDER — INSULIN LISPRO 100 [IU]/ML
10 INJECTION, SOLUTION INTRAVENOUS; SUBCUTANEOUS
Qty: 15 ML | Refills: 1 | Status: SHIPPED | OUTPATIENT
Start: 2025-07-09

## 2025-07-09 RX ORDER — METFORMIN HYDROCHLORIDE 500 MG/1
1000 TABLET, EXTENDED RELEASE ORAL NIGHTLY
Qty: 60 TABLET | Refills: 11 | Status: SHIPPED | OUTPATIENT
Start: 2025-07-09

## 2025-07-09 NOTE — PROGRESS NOTES
Pt denies complaints. Sees OB in 2 weeks. No NIPT yet. Fasting glucose: 102-168. 2hrsPP: 109-357. Pt wants PDC to manage DM. Has used dexcom in past and would like to do so again this pregnancy.

## 2025-07-09 NOTE — PROGRESS NOTES
Spoke with patient in person.  Diabetic folder given and reviewed the following, office hours and how to contact, Mychart messaging, How to send in blood sugar logs.  Dexcom ordered and instructions sent with patient on how to set up kaden.  Patient has been here previously and used a dexcom but her phone at that time was not compatible.  Patient attempted to set up kaden here in the office but was unable to do so.  Clinic code given to patient, patient states she has a device at home that will work, if not she knows how to use her  to upload to a device so we can see reports.  Patient is agreeable to send my chart message later updating on status of this. New prescriptions have been sent in to patients pharmacy.  Patient voices understanding to all instructions.  Margarita Du RN

## 2025-07-09 NOTE — ASSESSMENT & PLAN NOTE
Patient is currently taking Lantus 10u/30u and Humalog 2u with meals. She reports that her fastings are currently 102-168mg/dl and her post-prandial values range between 109-357mg/dl.     I reviewed the goals for blood glucose in pregnancy, including fasting blood sugars < 90-95mg/dl and 2-hr post-prandial values < 110-120mg/dl.     A Dexcom G7 was placed on the patient by RN and the associated apps were downloaded onto patient's phone.  Patient was shown how to enter food and insulin information into the kaden and how to associate patient's Dexcom with the clinic to allow for weekly review.  Additionally, RN reviewed diet and discussed the importance of protein with meals and snacks with the patient.  Patient voiced understanding and demonstrated the ability to use the Dexcom G7 kaden.       - Follow-up scheduled in 2wks   - Recommend increase in Lantus to 25u/25u   - Recommend increase in Humalog to 10u with meals   - Rx for Dexcom G7 called in to pharmacy   - We will download her AGP weekly and adjust her insulin doses as needed to achieve goals   - Baseline preeclampsia labs ordered today as well as a TSH and HgbA1c   - Plan to start patient on ASA 81mgs BID at 12wks   - Will need serial growth ultrasounds    - Will need fetal echo   - Will need  testing starting at 32wks

## 2025-07-09 NOTE — LETTER
2025     Tena Maciel CNM  [address] [place] [address]    Patient: Ginette Khalil   YOB: 1995   Date of Visit: 2025     Dear Tena Maciel CNM:       Thank you for referring Ginette Khalil to me for evaluation. Below are the relevant portions of my assessment and plan of care.    If you have questions, please do not hesitate to call me. I look forward to following Ginette along with you.         Sincerely,        Corrie Osborne MD        CC: No Recipients    Corrie Osborne MD  25 [phone]  Sign when Signing Visit      Maternal/Fetal Medicine Consult Note     Name: Ginette Khalil    : 1995     MRN: [id number]     Referring Provider: DOREEN See*    Chief Complaint  Type 2 DM    Subjective     History of Present Illness:  Ginette Khalil is a 30 y.o.  10w1d who presents today for a dating/viability ultrasound and to discuss diabetes in pregnancy.     Pt denies VB/cramping.     SHIRAZ: Estimated Date of Delivery: 2/3/26     ROS:   As noted in HPI.     Past Medical History:   Diagnosis Date   • Abnormal Pap smear of cervix     pap normal 2023   • Arthritis     occured after back surgery (herniated disc L3-L4)   • Diabetes mellitus 2019    Type 2; placed on metformin   • Fatty liver    • History of infertility     did not tolerate clomid- made her feel crazy   • History of migraine    • History of seizure     pt thinks it was related to mixing THC and adderall off street   • PCOS (polycystic ovarian syndrome)     patient states a few doctors told her she has PCOS      Past Surgical History:   Procedure Laterality Date   • APPENDECTOMY  2016    emergent-went to ED with pain   • BACK SURGERY      to repair herneated disc L3-L4   • LAPAROSCOPIC CHOLECYSTECTOMY     • WISDOM TOOTH EXTRACTION        OB History          2    Para   1    Term   1       0    AB   0    Living   1         SAB   0     "IAB   0    Ectopic   0    Molar   0    Multiple   0    Live Births   1          Obstetric Comments   Fob#1 pregnancy#1 and 2               Objective     Vital Signs  /64   Ht 167.6 cm (66\")   Wt 126 kg (277 lb 9.6 oz)   LMP 2025   Estimated body mass index is 44.81 kg/m² as calculated from the following:    Height as of this encounter: 167.6 cm (66\").    Weight as of this encounter: 126 kg (277 lb 9.6 oz).    Physical Exam  Constitutional:       Appearance: Normal appearance. She is obese.   HENT:      Head: Normocephalic and atraumatic.   Cardiovascular:      Rate and Rhythm: Normal rate.   Pulmonary:      Effort: Pulmonary effort is normal.   Musculoskeletal:         General: Normal range of motion.      Cervical back: Normal range of motion and neck supple.   Neurological:      General: No focal deficit present.      Mental Status: She is alert and oriented to person, place, and time.   Psychiatric:         Mood and Affect: Mood normal.         Behavior: Behavior normal.         Thought Content: Thought content normal.         Judgment: Judgment normal.     Ultrasound Impression:   Viable SIUP with CRL c/w dates at 10wks.     Assessment and Plan     Diagnoses and all orders for this visit:    1. Fatty liver in mother during pregnancy (Primary)  -     [facility]; Future    2. Morbid obesity with BMI of 40.0-44.9, adult  -     [facility]; Future    3. Type 2 diabetes mellitus in pregnancy, third trimester  Assessment & Plan:  Patient is currently taking Lantus 10u/30u and Humalog 2u with meals. She reports that her fastings are currently 102-168mg/dl and her post-prandial values range between 109-357mg/dl.     I reviewed the goals for blood glucose in pregnancy, including fasting blood sugars < 90-95mg/dl and 2-hr post-prandial values < 110-120mg/dl.     A Dexcom G7 was placed on the patient by RN and the associated apps were downloaded onto patient's " phone.  Patient was shown how to enter food and insulin information into the kaden and how to associate patient's Dexcom with the clinic to allow for weekly review.  Additionally, RN reviewed diet and discussed the importance of protein with meals and snacks with the patient.  Patient voiced understanding and demonstrated the ability to use the Dexcom G7 kaden.       - Follow-up scheduled in 2wks   - Recommend increase in Lantus to 25u/25u   - Recommend increase in Humalog to 10u with meals   - Rx for Dexcom G7 called in to pharmacy   - We will download her AGP weekly and adjust her insulin doses as needed to achieve goals   - Plan to start patient on ASA 81mgs BID at 12wks   - Will need serial growth ultrasounds    - Will need fetal echo   - Will need  testing starting at 32wks    Orders:  -     [facility]; Future  -     Preeclampsia Panel  -     TSH  -     CBC (No Diff)  -     Creatinine Urine Random (kidney function) GFR component - Urine, Clean Catch  -     Protein, Urine, Random - Urine, Clean Catch  -     Hemoglobin A1c    4. 10 weeks gestation of pregnancy         Follow Up  Return in about 2 weeks (around 2025).    I spent 30 minutes caring for the patient on the day of service. This included: obtaining or reviewing a separately obtained medical history, reviewing patient records, performing a medically appropriate exam and/or evaluation, counseling or educating the patient/family/caregiver, ordering medications, labs, and/or procedures and documenting such in the medical record. This does not include time spent on review and interpretation of other tests such as fetal ultrasound or the performance of other procedures such as amniocentesis or CVS.      Corrie Osborne MD  2025

## 2025-07-09 NOTE — PROGRESS NOTES
"    Maternal/Fetal Medicine Consult Note     Name: Ginette Khalil    : 1995     MRN: [id number]     Referring Provider: DEMETRIS See    Chief Complaint  Type 2 DM    Subjective     History of Present Illness:  Ginette Khalil is a 30 y.o.  10w1d who presents today for a dating/viability ultrasound and to discuss diabetes in pregnancy.     Pt denies VB/cramping.     SHIRAZ: Estimated Date of Delivery: 2/3/26     ROS:   As noted in HPI.     Past Medical History:   Diagnosis Date    Abnormal Pap smear of cervix     pap normal 2023    Arthritis     occured after back surgery (herniated disc L3-L4)    Diabetes mellitus     Type 2; placed on metformin    Fatty liver     History of infertility     did not tolerate clomid- made her feel crazy    History of migraine     History of seizure     pt thinks it was related to mixing THC and adderall off street    PCOS (polycystic ovarian syndrome)     patient states a few doctors told her she has PCOS      Past Surgical History:   Procedure Laterality Date    APPENDECTOMY      emergent-went to ED with pain    BACK SURGERY      to repair herneated disc L3-L4    LAPAROSCOPIC CHOLECYSTECTOMY      WISDOM TOOTH EXTRACTION        OB History          2    Para   1    Term   1       0    AB   0    Living   1         SAB   0    IAB   0    Ectopic   0    Molar   0    Multiple   0    Live Births   1          Obstetric Comments   Fob#1 pregnancy#1 and 2               Objective     Vital Signs  /64   Ht 167.6 cm (66\")   Wt 126 kg (277 lb 9.6 oz)   LMP 2025   Estimated body mass index is 44.81 kg/m² as calculated from the following:    Height as of this encounter: 167.6 cm (66\").    Weight as of this encounter: 126 kg (277 lb 9.6 oz).    Physical Exam  Constitutional:       Appearance: Normal appearance. She is obese.   HENT:      Head: Normocephalic and atraumatic.   Cardiovascular:      Rate and Rhythm: Normal " rate.   Pulmonary:      Effort: Pulmonary effort is normal.   Musculoskeletal:         General: Normal range of motion.      Cervical back: Normal range of motion and neck supple.   Neurological:      General: No focal deficit present.      Mental Status: She is alert and oriented to person, place, and time.   Psychiatric:         Mood and Affect: Mood normal.         Behavior: Behavior normal.         Thought Content: Thought content normal.         Judgment: Judgment normal.     Ultrasound Impression:   Viable SIUP with CRL c/w dates at 10wks.     Assessment and Plan     Diagnoses and all orders for this visit:    1. Fatty liver in mother during pregnancy (Primary)  -     [facility]; Future    2. Morbid obesity with BMI of 40.0-44.9, adult  -     [facility]; Future    3. Type 2 diabetes mellitus in pregnancy, third trimester  Assessment & Plan:  Patient is currently taking Lantus 10u/30u and Humalog 2u with meals. She reports that her fastings are currently 102-168mg/dl and her post-prandial values range between 109-357mg/dl.     I reviewed the goals for blood glucose in pregnancy, including fasting blood sugars < 90-95mg/dl and 2-hr post-prandial values < 110-120mg/dl.     A Dexcom G7 was placed on the patient by RN and the associated apps were downloaded onto patient's phone.  Patient was shown how to enter food and insulin information into the kaden and how to associate patient's Dexcom with the clinic to allow for weekly review.  Additionally, RN reviewed diet and discussed the importance of protein with meals and snacks with the patient.  Patient voiced understanding and demonstrated the ability to use the Dexcom G7 kaden.       - Follow-up scheduled in 2wks   - Recommend increase in Lantus to 25u/25u   - Recommend increase in Humalog to 10u with meals   - Rx for Dexcom G7 called in to pharmacy   - We will download her AGP weekly and adjust her insulin doses as  needed to achieve goals   - Plan to start patient on ASA 81mgs BID at 12wks   - Will need serial growth ultrasounds    - Will need fetal echo   - Will need  testing starting at 32wks    Orders:  -     [facility]; Future  -     Preeclampsia Panel  -     TSH  -     CBC (No Diff)  -     Creatinine Urine Random (kidney function) GFR component - Urine, Clean Catch  -     Protein, Urine, Random - Urine, Clean Catch  -     Hemoglobin A1c    4. 10 weeks gestation of pregnancy         Follow Up  Return in about 2 weeks (around 2025).    I spent 30 minutes caring for the patient on the day of service. This included: obtaining or reviewing a separately obtained medical history, reviewing patient records, performing a medically appropriate exam and/or evaluation, counseling or educating the patient/family/caregiver, ordering medications, labs, and/or procedures and documenting such in the medical record. This does not include time spent on review and interpretation of other tests such as fetal ultrasound or the performance of other procedures such as amniocentesis or CVS.      Corrie Osborne MD  2025

## 2025-07-16 ENCOUNTER — TELEPHONE (OUTPATIENT)
Dept: OBSTETRICS AND GYNECOLOGY | Facility: HOSPITAL | Age: 30
End: 2025-07-16
Payer: COMMERCIAL

## 2025-07-16 ENCOUNTER — MEDICATION THERAPY MANAGEMENT (OUTPATIENT)
Dept: OBSTETRICS AND GYNECOLOGY | Facility: HOSPITAL | Age: 30
End: 2025-07-16
Payer: COMMERCIAL

## 2025-07-16 DIAGNOSIS — O24.113 TYPE 2 DIABETES MELLITUS IN PREGNANCY, THIRD TRIMESTER: ICD-10-CM

## 2025-07-16 RX ORDER — INSULIN LISPRO 100 [IU]/ML
15 INJECTION, SOLUTION INTRAVENOUS; SUBCUTANEOUS
Qty: 15 ML | Refills: 1 | Status: SHIPPED | OUTPATIENT
Start: 2025-07-16

## 2025-07-16 NOTE — TELEPHONE ENCOUNTER
Attempted to reach patient by phone.  Left message stating a detailed [employer] message is being sent and to please respond to that.  If she has questions, please call the office at [phone].  Margarita Du RN

## 2025-07-23 ENCOUNTER — HOSPITAL ENCOUNTER (OUTPATIENT)
Dept: WOMENS IMAGING | Facility: HOSPITAL | Age: 30
Discharge: HOME | End: 2025-07-23
Admitting: OBSTETRICS & GYNECOLOGY
Payer: COMMERCIAL

## 2025-07-23 ENCOUNTER — OFFICE VISIT (OUTPATIENT)
Dept: OBSTETRICS AND GYNECOLOGY | Facility: HOSPITAL | Age: 30
End: 2025-07-23
Payer: COMMERCIAL

## 2025-07-23 VITALS — BODY MASS INDEX: 44.71 KG/M2 | SYSTOLIC BLOOD PRESSURE: 101 MMHG | DIASTOLIC BLOOD PRESSURE: 71 MMHG | WEIGHT: 277 LBS

## 2025-07-23 DIAGNOSIS — K76.0 FATTY LIVER IN MOTHER DURING PREGNANCY: ICD-10-CM

## 2025-07-23 DIAGNOSIS — O26.619 FATTY LIVER IN MOTHER DURING PREGNANCY: ICD-10-CM

## 2025-07-23 DIAGNOSIS — E66.01 MORBID OBESITY WITH BMI OF 40.0-44.9, ADULT: Primary | ICD-10-CM

## 2025-07-23 DIAGNOSIS — E66.01 MORBID OBESITY WITH BMI OF 40.0-44.9, ADULT: ICD-10-CM

## 2025-07-23 DIAGNOSIS — O24.119 TYPE 2 DIABETES MELLITUS AFFECTING PREGNANCY, ANTEPARTUM: ICD-10-CM

## 2025-07-23 DIAGNOSIS — O24.113 TYPE 2 DIABETES MELLITUS IN PREGNANCY, THIRD TRIMESTER: ICD-10-CM

## 2025-07-23 PROCEDURE — 76801 OB US < 14 WKS SINGLE FETUS: CPT

## 2025-07-23 PROCEDURE — 76813 OB US NUCHAL MEAS 1 GEST: CPT

## 2025-07-23 RX ORDER — PROGESTERONE 200 MG/1
200 CAPSULE ORAL DAILY
COMMUNITY
Start: 2025-06-26

## 2025-07-23 NOTE — PROGRESS NOTES
Patient denies bleeding, leaking fluid or cramping  NIPT drawn 7/22/25, pending  Patients next appointment with VASU Maciel CNM 8/19/25

## 2025-07-23 NOTE — LETTER
"2025     Tena Maciel CNM  [address] [place] [address]    Patient: Ginette Khalil   YOB: 1995   Date of Visit: 2025     Dear Tena Maciel CNM:       Thank you for referring Ginette Khalil to me for evaluation. Below are the relevant portions of my assessment and plan of care.    If you have questions, please do not hesitate to call me. I look forward to following Ginette along with you.         Sincerely,        Douglas A. Milligan, MD        CC: No Recipients    Milligan, Douglas A, MD  25 1137  Sign when Signing Visit  Documentation of the ultrasound findings, images, and interpretations will be available in the patient's Viewpoint report which is located in the imaging tab in chart review.    Maternal/Fetal Medicine Follow Up  Note     Name: Ginette Khalil    : 1995     MRN: [id number]     Referring Provider: DOREEN See*    Chief Complaint  T2DM, MO    Subjective     History of Present Illness:  Ginette Khalil is a 30 y.o.  12w1d who presents today for diabetes    SHIRAZ: Estimated Date of Delivery: 2/3/26     ROS:   As noted in HPI.     Objective     Vital Signs  /71   Wt 126 kg (277 lb)   LMP 2025   Estimated body mass index is 44.71 kg/m² as calculated from the following:    Height as of 25: 167.6 cm (66\").    Weight as of this encounter: 126 kg (277 lb).    Physical Exam    Ultrasound Impression:   See Viewpoint    Assessment and Plan     Ginette Khalil is a 30 y.o.  12w1d who presents today for diabetes    Diagnoses and all orders for this visit:    1. Morbid obesity with BMI of 40.0-44.9, adult (Primary)  -     [facility]; Future    2. Fatty liver in mother during pregnancy  -     [facility]; Future    3. Type 2 diabetes mellitus affecting pregnancy, antepartum  Assessment & Plan:  Patient returns today for follow-up for pregnancy " complicated by type 2 diabetes mellitus.  Patient's glucoses from 1 week ago were reviewed and appear to be under excellent control.  Patient is providing her glucoses this week and we will review them and make alterations as needed.  Patient reports no complications since her last visit.    Ultrasound today demonstrates a normal intrauterine pregnancy.  Size is consistent with dates.  Nuchal translucency is within normal limits and nasal bone is present.  No abnormalities are seen.    Patient had her cell free DNA screen drawn yesterday.  We informed the patient that there were no markers for trisomy or cardiac anomalies on today's ultrasound.  We will continue to review the patient's glucoses weekly and make alterations to her therapy as needed.  Based on her glucoses from a week ago I did not make any changes today but we will review her most recent glucoses and make a judgment on them.  We will rescan the patient again in 8 weeks time to perform an anatomic survey and we will perform a fetal echocardiogram 4 weeks after that.           Follow Up  Return in about 8 weeks (around 2025).    I spent 20 minutes caring for the patient on the day of service. This included: obtaining or reviewing a separately obtained medical history, reviewing patient records, performing a medically appropriate exam and/or evaluation, counseling or educating the patient/family/caregiver, ordering medications, labs, and/or procedures and documenting such in the medical record. This does not include time spent on review and interpretation of other tests such as fetal ultrasound or the performance of other procedures such as amniocentesis or CVS.      Douglas A. Milligan, MD  Maternal Fetal Medicine, [facility]     2025

## 2025-07-23 NOTE — ASSESSMENT & PLAN NOTE
Patient returns today for follow-up for pregnancy complicated by type 2 diabetes mellitus.  Patient's glucoses from 1 week ago were reviewed and appear to be under excellent control.  Patient is providing her glucoses this week and we will review them and make alterations as needed.  Patient reports no complications since her last visit.    Ultrasound today demonstrates a normal intrauterine pregnancy.  Size is consistent with dates.  Nuchal translucency is within normal limits and nasal bone is present.  No abnormalities are seen.    Patient had her cell free DNA screen drawn yesterday.  We informed the patient that there were no markers for trisomy or cardiac anomalies on today's ultrasound.  We will continue to review the patient's glucoses weekly and make alterations to her therapy as needed.  Based on her glucoses from a week ago I did not make any changes today but we will review her most recent glucoses and make a judgment on them.  We will rescan the patient again in 8 weeks time to perform an anatomic survey and we will perform a fetal echocardiogram 4 weeks after that.

## 2025-07-23 NOTE — PROGRESS NOTES
"Documentation of the ultrasound findings, images, and interpretations will be available in the patient's Viewpoint report which is located in the imaging tab in chart review.    Maternal/Fetal Medicine Follow Up  Note     Name: Ginette Khalil    : 1995     MRN: [id number]     Referring Provider: DOREEN See*    Chief Complaint  T2DM, MO    Subjective     History of Present Illness:  Ginette Khalil is a 30 y.o.  12w1d who presents today for diabetes    SHIRAZ: Estimated Date of Delivery: 2/3/26     ROS:   As noted in HPI.     Objective     Vital Signs  /71   Wt 126 kg (277 lb)   LMP 2025   Estimated body mass index is 44.71 kg/m² as calculated from the following:    Height as of 25: 167.6 cm (66\").    Weight as of this encounter: 126 kg (277 lb).    Physical Exam    Ultrasound Impression:   See Viewpoint    Assessment and Plan     Ginette Khalil is a 30 y.o.  12w1d who presents today for diabetes    Diagnoses and all orders for this visit:    1. Morbid obesity with BMI of 40.0-44.9, adult (Primary)  -     [facility]    2. Fatty liver in mother during pregnancy  -     [facility]    3. Type 2 diabetes mellitus affecting pregnancy, antepartum  Assessment & Plan:  Patient returns today for follow-up for pregnancy complicated by type 2 diabetes mellitus.  Patient's glucoses from 1 week ago were reviewed and appear to be under excellent control.  Patient is providing her glucoses this week and we will review them and make alterations as needed.  Patient reports no complications since her last visit.    Ultrasound today demonstrates a normal intrauterine pregnancy.  Size is consistent with dates.  Nuchal translucency is within normal limits and nasal bone is present.  No abnormalities are seen.    Patient had her cell free DNA screen drawn yesterday.  We informed the patient that there were no markers for trisomy " or cardiac anomalies on today's ultrasound.  We will continue to review the patient's glucoses weekly and make alterations to her therapy as needed.  Based on her glucoses from a week ago I did not make any changes today but we will review her most recent glucoses and make a judgment on them.  We will rescan the patient again in 8 weeks time to perform an anatomic survey and we will perform a fetal echocardiogram 4 weeks after that.           Follow Up  Return in about 8 weeks (around 2025).    I spent 20 minutes caring for the patient on the day of service. This included: obtaining or reviewing a separately obtained medical history, reviewing patient records, performing a medically appropriate exam and/or evaluation, counseling or educating the patient/family/caregiver, ordering medications, labs, and/or procedures and documenting such in the medical record. This does not include time spent on review and interpretation of other tests such as fetal ultrasound or the performance of other procedures such as amniocentesis or CVS.      Douglas A. Milligan, MD  Maternal Fetal Medicine, [facility]     2025

## 2025-07-24 ENCOUNTER — MEDICATION THERAPY MANAGEMENT (OUTPATIENT)
Dept: OBSTETRICS AND GYNECOLOGY | Facility: HOSPITAL | Age: 30
End: 2025-07-24
Payer: COMMERCIAL

## 2025-07-24 DIAGNOSIS — O24.113 TYPE 2 DIABETES MELLITUS IN PREGNANCY, THIRD TRIMESTER: ICD-10-CM

## 2025-07-24 RX ORDER — INSULIN LISPRO 100 [IU]/ML
18 INJECTION, SOLUTION INTRAVENOUS; SUBCUTANEOUS
Qty: 15 ML | Refills: 1 | Status: SHIPPED | OUTPATIENT
Start: 2025-07-24

## 2025-07-30 ENCOUNTER — TELEPHONE (OUTPATIENT)
Dept: OBSTETRICS AND GYNECOLOGY | Facility: HOSPITAL | Age: 30
End: 2025-07-30
Payer: COMMERCIAL

## 2025-07-30 NOTE — TELEPHONE ENCOUNTER
Attempted to reach patient by phone.  Unable to leave message due to voice mailbox being full.  Thank you  Margarita Du RN   [facility]  [phone]

## 2025-08-06 ENCOUNTER — MEDICATION THERAPY MANAGEMENT (OUTPATIENT)
Dept: OBSTETRICS AND GYNECOLOGY | Facility: HOSPITAL | Age: 30
End: 2025-08-06
Payer: COMMERCIAL

## 2025-08-06 DIAGNOSIS — O24.113 TYPE 2 DIABETES MELLITUS IN PREGNANCY, THIRD TRIMESTER: ICD-10-CM

## 2025-08-12 ENCOUNTER — TELEPHONE (OUTPATIENT)
Dept: OBSTETRICS AND GYNECOLOGY | Facility: HOSPITAL | Age: 30
End: 2025-08-12
Payer: COMMERCIAL

## 2025-08-13 ENCOUNTER — MEDICATION THERAPY MANAGEMENT (OUTPATIENT)
Dept: OBSTETRICS AND GYNECOLOGY | Facility: HOSPITAL | Age: 30
End: 2025-08-13
Payer: COMMERCIAL

## 2025-08-13 DIAGNOSIS — O24.113 TYPE 2 DIABETES MELLITUS IN PREGNANCY, THIRD TRIMESTER: ICD-10-CM

## 2025-08-13 RX ORDER — INSULIN LISPRO 100 [IU]/ML
INJECTION, SOLUTION INTRAVENOUS; SUBCUTANEOUS
Qty: 15 ML | Refills: 1 | Status: SHIPPED | OUTPATIENT
Start: 2025-08-13

## 2025-08-18 ENCOUNTER — DOCUMENTATION (OUTPATIENT)
Dept: OBSTETRICS AND GYNECOLOGY | Facility: HOSPITAL | Age: 30
End: 2025-08-18
Payer: COMMERCIAL

## 2025-08-20 ENCOUNTER — MEDICATION THERAPY MANAGEMENT (OUTPATIENT)
Dept: OBSTETRICS AND GYNECOLOGY | Facility: HOSPITAL | Age: 30
End: 2025-08-20
Payer: COMMERCIAL

## 2025-08-20 DIAGNOSIS — O24.113 TYPE 2 DIABETES MELLITUS IN PREGNANCY, THIRD TRIMESTER: ICD-10-CM

## 2025-08-20 RX ORDER — INSULIN LISPRO 100 [IU]/ML
INJECTION, SOLUTION INTRAVENOUS; SUBCUTANEOUS
Qty: 15 ML | Refills: 1 | Status: SHIPPED | OUTPATIENT
Start: 2025-08-20

## 2025-08-27 ENCOUNTER — MEDICATION THERAPY MANAGEMENT (OUTPATIENT)
Dept: OBSTETRICS AND GYNECOLOGY | Facility: HOSPITAL | Age: 30
End: 2025-08-27
Payer: COMMERCIAL

## 2025-08-27 DIAGNOSIS — O24.113 TYPE 2 DIABETES MELLITUS IN PREGNANCY, THIRD TRIMESTER: ICD-10-CM

## 2025-08-27 RX ORDER — INSULIN LISPRO 100 [IU]/ML
INJECTION, SOLUTION INTRAVENOUS; SUBCUTANEOUS
Qty: 15 ML | Refills: 1 | Status: SHIPPED | OUTPATIENT
Start: 2025-08-27